# Patient Record
Sex: MALE | Race: WHITE | Employment: OTHER | URBAN - METROPOLITAN AREA
[De-identification: names, ages, dates, MRNs, and addresses within clinical notes are randomized per-mention and may not be internally consistent; named-entity substitution may affect disease eponyms.]

---

## 2019-10-24 ENCOUNTER — HOSPITAL ENCOUNTER (OUTPATIENT)
Dept: WOUND CARE | Age: 74
Discharge: HOME | End: 2019-10-24
Payer: MEDICARE

## 2019-10-24 VITALS — TEMPERATURE: 98 F | HEART RATE: 79 BPM | SYSTOLIC BLOOD PRESSURE: 133 MMHG | DIASTOLIC BLOOD PRESSURE: 76 MMHG

## 2019-10-24 VITALS — HEART RATE: 81 BPM | TEMPERATURE: 98.1 F | SYSTOLIC BLOOD PRESSURE: 138 MMHG | DIASTOLIC BLOOD PRESSURE: 78 MMHG

## 2019-10-24 PROBLEM — K94.00 COMPLICATION OF COLOSTOMY (HCC): Status: ACTIVE | Noted: 2019-10-24

## 2019-10-24 PROBLEM — R21 RASH AND OTHER NONSPECIFIC SKIN ERUPTION: Status: ACTIVE | Noted: 2019-10-24

## 2019-10-24 PROCEDURE — 99213 OFFICE O/P EST LOW 20 MIN: CPT

## 2019-10-24 PROCEDURE — 99211 OFF/OP EST MAY X REQ PHY/QHP: CPT

## 2019-10-24 RX ORDER — LANOLIN ALCOHOL/MO/W.PET/CERES
400 CREAM (GRAM) TOPICAL DAILY
COMMUNITY

## 2019-10-24 RX ORDER — RANITIDINE 150 MG/1
150 TABLET, FILM COATED ORAL 2 TIMES DAILY
COMMUNITY

## 2019-10-24 RX ORDER — TORSEMIDE 20 MG/1
20 TABLET ORAL DAILY
COMMUNITY

## 2019-10-24 RX ORDER — GUAIFENESIN 100 MG/5ML
81 LIQUID (ML) ORAL DAILY
COMMUNITY

## 2019-10-24 RX ORDER — BUDESONIDE AND FORMOTEROL FUMARATE DIHYDRATE 160; 4.5 UG/1; UG/1
2 AEROSOL RESPIRATORY (INHALATION) 2 TIMES DAILY
COMMUNITY

## 2019-10-24 RX ORDER — POTASSIUM CHLORIDE 750 MG/1
TABLET, FILM COATED, EXTENDED RELEASE ORAL DAILY
COMMUNITY

## 2019-10-24 RX ORDER — PRASUGREL 10 MG/1
10 TABLET, FILM COATED ORAL
COMMUNITY

## 2019-10-24 RX ORDER — DILTIAZEM HYDROCHLORIDE EXTENDED-RELEASE TABLETS 240 MG/1
240 TABLET, EXTENDED RELEASE ORAL DAILY
COMMUNITY

## 2019-10-24 RX ORDER — IRBESARTAN 150 MG/1
150 TABLET ORAL
COMMUNITY

## 2019-10-24 NOTE — DISCHARGE INSTRUCTIONS
Discharge Instructions for  [address] [facility], [address]  Telephone: [phone]    NAME:  Deedee Carver OF BIRTH:  1945  MEDICAL RECORD NUMBER:  [id number]  DATE:  10/24/2019  Please proceed to [facility] wound center today for ostomy consult. Dietary:  [x] Diet as tolerated: [] Calorie Diabetic Diet: [] No Added Salt:  [x] Increase Protein: [] Other:   Activity:  [x] Activity as tolerated:  [] Patient has no activity restrictions     [] Strict Bedrest: [] Remain off Work:     [] May return to full duty work:                                   [] Return to work with restrictions:             Return Appointment:  [] Wound and dressing supply provider:   [] ECF or Home Healthcare:  [] Wound Assessment: [] Physician or NP scheduled for Wound Assessment:   [x] [facility]  [x] Ordered tests: RX medrol dose pack     Electronically signed on 10/24/2019 at 2:37 PM     [address] [place] [address] Information: Should you experience any significant changes in your wound(s) or have questions about your wound care, please contact the [address] [place] [address] at [address] [place] [address] 8:00 am - 4:30. If you need help with your wound outside these hours and cannot wait until we are again available, contact your PCP or go to the hospital emergency room. PLEASE NOTE: IF YOU ARE UNABLE TO OBTAIN WOUND SUPPLIES, CONTINUE TO USE THE SUPPLIES YOU HAVE AVAILABLE UNTIL YOU ARE ABLE TO REACH US. IT IS MOST IMPORTANT TO KEEP THE WOUND COVERED AT ALL TIMES.       Physician Signature:_______________________    Date: ___________ Time:  ____________

## 2019-10-24 NOTE — WOUND CARE
Clinic Level of Care Assessment NAME:  Leoncio Gonzalez YOB: 1945 GENDER: male MEDICAL RECORD NUMBER:  [id number] DATE:  10/24/2019 Wound Count Document in [facility] Number of Wounds Assessed Points No Wounds/Ulcers []   0 Less than Three Wounds/Ulcers [x]   1  
3-6 Wounds/Ulcers []   2 Greater than 6 Wounds/Ulcers []   3 Ambulation Status Document in Coord/JIMMY/Mobility tab Status Definition Points Independent Independently able to ambulate. Fully able (without any assistance) to get on/off exam table/chair. []   0 Minimal Physical Assistance Requires physical assistance of one person to ambulate and/or position patient to be examined. Includes necessary physical assistance to position lower extremities on/off stool. [x]   1 Moderate Physical Assistance Requires at least one staff member to physically assist patient in ambulating into treatment room, and on/off exam table. []   2 Full Assistance Requires assistance of at least two staff members to transfer patient into treatment room and/or on/off exam table/chair. \"Total Transfer\". []   3 Dressing Complexity Document in [facility] and Write Appropriate Order Complexity Definition Points No Dressing  []   0 Simple Minimal, simple dressing. i.e. Band-aid, gauze, simple wrap. []   1 Intermediate Moderately complicated requiring licensed personnel to apply i.e. collagen matrix, ointments, gels, alginates. [x]   2 Complex Complicated requiring licensed personnel to apply dressings 6 or more wounds. []   3 Teaching Effort Document in Education Tab Effort Definition Points No Teaching  []   0 Simple Reinforce two or less topics. Document in Education navigator.  []   1 Intermediate Reinforce three to five topics and/or one additional  
new topic. Document in Education navigator. [x]   2 Complex Teach more than one new topic. New patient information packet reviewed and/or reinforce more than three topics. Document in Education navigator. HBO initial instruction. []   3 Patient Assessment and Planning Planning Definition Points Simple Multiple System Simple: Simple follow-up with routine assessment and planning. If Discharged, instructions and long term/follow-up care given to patient/caregiver. Discharged, instructions and/or After Visit Summary given to patient/caregiver and instructions completed. []   1 Intermediate Multiple System Intermediate: Contact with outside resources; i.e. Telephone calls to home health, [facility]. May include filling out forms and writing letters, arranging transportation, communication with insurance , vendors, etc.  Discharged, instructions and/or After Visit Summary given to patient/caregiver and instructions completed. [x]   2 Complex Multiple System Complex: Full, comprehensive assessment and planning. Follow the entire navigator under Wound Visit charting filling out each tab which includes OP Adm Database Screening, Education and CarePlan  HBO risk assessment completed. Discharged, instructions and/or After Visit Summary given to patient/caregiver and instructions completed. []   3 Is this the Patient's First Visit to the [address] [place] [address] Road No 
 
 
Is this Patient Established @ [facility] 
Yes Clinical Level of Care Points  0-2  Level 1 [] Points  3-5  Level 2 [] Points  6-9  Level 3 [x] Points  10-12  Level 4 [] Points  13-15  Level 5 [] Electronically signed by Juan Carlos Jones RN on 10/24/2019 at 4:45 PM

## 2019-10-24 NOTE — WOUND CARE
10/24/19 1403 Wound Abdomen 10/24/19 Date First Assessed/Time First Assessed: 10/24/19 1402   Present on Hospital Admission: Yes  Location: Abdomen  Date of First Observation: 10/24/19 Dressing Type Open to air Wound Length (cm) 19 cm Wound Width (cm) 41 cm Wound Depth (cm) 0.1 cm Wound Volume (cm^3) 77.9 cm^3 Drainage Amount None Wound Odor None Visit Vitals /76 (BP 1 Location: Right arm, BP Patient Position: At rest) Pulse 79 Temp 98 °F (36.7 °C)

## 2019-10-24 NOTE — H&P
[facility]  HISTORY AND PHYSICAL    Name:  Blake Mccabe  MR#:  [id number]  :  1945  ACCOUNT #:  [de-identified]  ADMIT DATE:  10/24/2019      HISTORY OF PRESENT ILLNESS:  The patient is a 69-year-old male who presents with a blistering rash on his abdomen which has been present for about a month. His history is he was recently diagnosed with lung cancer, he had diverticulitis that required emergency surgery with a colostomy after being on chemotherapy. He tolerated the colostomy quite well while he was at [facility], but after being at home, he started developing blisters and a rash on his abdomen. He has no evidence of a rash anywhere else on his body. His medication for the lung cancer has been held until he is able to get this under control and he is due to have a PET scan on 2019. MEDICATIONS:  Symbicort, potassium chloride, Avapro, diltiazem, torsemide, which is a loop diuretic, folic acid, ranitidine, Effient, vitamin D3, hydromorphone. ALLERGIES:  HE HAS NO KNOWN DRUG ALLERGIES. HABITS:  He discontinued cigarettes in , after a 50-pack year history. He does drink alcohol socially. PREVIOUS OPERATIONS:  Lumbar laminectomy and a revision lumbar laminectomy along with the previously described colostomy. PHYSICAL EXAMINATION:  GENERAL:  The patient presents tired and appeared older than stated age. VITAL SIGNS:  His temperature is 98, pulse 79, respirations 18, blood pressure 133/76. NEUROLOGIC:  Cranial nerves II through XII grossly intact. HEAD AND NECK:  Oral cavity, oropharynx, neck is normal.  LUNGS:  Clear to auscultation and percussion. HEART:  Regular rate and rhythm without murmur. ABDOMEN:   Soft, nontender. He does have a midline colostomy. He has a blistering rash around the stoma and extending laterally onto the left flank. He has no open wounds.   He does have scabs or obviously some of the blisters have previously ruptured. EXTREMITIES:  His legs are normal.  He has decreased strength but equal tone and strength bilaterally. Upper extremities:  Equal tone and strength bilaterally. ASSESSMENT AND PLAN:  My impression is this is an allergic contact dermatitis and he is reacting to the stoma appliance, or from seepage of stool from the bag causing a dermatitis. He did well while he was in the hospital and his family believes he might have had a change in devices once he was discharged and when he started developing the rash. I am sending him over to the [place] [address] today to see Sharmila Correia RN, for her evaluation of the stoma. I would like to try a more hypoallergenic colostomy system or a better fitting one. Because of his rash, I believe that a steroid would help, but I am concerned that a cream would make his appliance not adhere to the skin. I am going to give him a prescription for Medrol Dosepak. He will see Leann Khan this afternoon, and I will see him in followup in 1 week at the [facility]. All questions were answered. His condition on discharge is stable.         Aubrey Wilson MD      [id number]  D:  10/24/2019 14:46  T:  10/24/2019 15:43  JOB #:  [id number]

## 2019-10-24 NOTE — WOUND CARE
10/24/19 1637 Wound Abdomen 10/24/19 Date First Assessed/Time First Assessed: 10/24/19 1402   Present on Hospital Admission: Yes  Location: Abdomen  Date of First Observation: 10/24/19 Drainage Amount Small 
(Weeping from skin desquamation) Drainage Color Serous Wound Odor None Chacha-wound Assessment Denuded Cleansing and Cleansing Agents (Tap water) Dressing Type Applied  
([place] [address], Mepilex lite nonadherent foam) Discharge Condition: Stable Pain: 3 Ambulatory Status: [facility] Discharge Destination: Home Transportation: Car Accompanied by: Self  and Family/Caregiver Discharge instructions reviewed with Patient and Family/Caregiver  and copy or written instructions have been provided. All questions/concerns have been addressed at this time.

## 2019-10-24 NOTE — PROGRESS NOTES
Clinic Level of Care Assessment    NAME:  Sanya Rosales OF BIRTH:  1945 GENDER: male  MEDICAL RECORD NUMBER:  [id number]   DATE:  10/24/2019      Wound Count Document in [address] [place] [address]  Number of Wounds Assessed Points   No Wounds/Ulcers [x]   0   Less than Three Wounds/Ulcers []   1   3-6 Wounds/Ulcers []   2   Greater than 6 Wounds/Ulcers []   3     Ambulation Status Document in Coord/JIMMY/Mobility tab  Status Definition Points   Independent Independently able to ambulate. Fully able (without any assistance) to get on/off exam table/chair. [x]   0   Minimal Physical Assistance Requires physical assistance of one person to ambulate and/or position patient to be examined. Includes necessary physical assistance to position lower extremities on/off stool. []   1   Moderate Physical Assistance Requires at least one staff member to physically assist patient in ambulating into treatment room, and on/off exam table. []   2   Full Assistance Requires assistance of at least two staff members to transfer patient into treatment room and/or on/off exam table/chair. \"Total Transfer\". []   3     Dressing Complexity Document in LDA and Write Appropriate Order  Complexity Definition Points   No Dressing  [x]   0   Simple Minimal, simple dressing. i.e. Band-aid, gauze, simple wrap. []   1   Intermediate Moderately complicated requiring licensed personnel to apply i.e. collagen matrix, ointments, gels, alginates. []   2   Complex Complicated requiring licensed personnel to apply dressings 6 or more wounds. []   3     Teaching Effort Document in Education Tab   Effort Definition Points   No Teaching  []   0   Simple Reinforce two or less topics. Document in Education navigator. [x]   1   Intermediate Reinforce three to five topics and/or one additional   new topic. Document in Education navigator. []   2   Complex Teach more than one new topic.  New patient information   packet reviewed and/or reinforce more than three topics. Document in Education navigator. HBO initial instruction. []   3       Patient Assessment and Planning  Planning Definition Points   Simple Multiple System Simple: Simple follow-up with routine assessment and planning. If Discharged, instructions and long term/follow-up care given to patient/caregiver. Discharged, instructions and/or After Visit Summary given to patient/caregiver and instructions completed. [x]   1   Intermediate Multiple System Intermediate: Contact with outside resources; i.e. Telephone calls to home health, [facility]. May include filling out forms and writing letters, arranging transportation, communication with insurance , vendors, etc.  Discharged, instructions and/or After Visit Summary given to patient/caregiver and instructions completed. []   2   Complex Multiple System Complex: Full, comprehensive assessment and planning. Follow the entire navigator under Wound Visit charting filling out each tab which includes OP Adm Database Screening, Education and CarePlan  HBO risk assessment completed. Discharged, instructions and/or After Visit Summary given to patient/caregiver and instructions completed.    []   3           Is this the Patient's First Visit to the [address] [place] [address] Road  Yes      Is this Patient Established @ [facility]  Yes             Clinical Level of Care      Points  0-2  Level 1 [x]     Points  3-5  Level 2 []     Points  6-9  Level 3 []     Points  10-12  Level 4 []     Points  13-15  Level 5 []       Electronically signed by Jignesh Schmidt RN on 10/24/2019 at 6:22 PM

## 2019-11-01 ENCOUNTER — HOSPITAL ENCOUNTER (OUTPATIENT)
Dept: WOUND CARE | Age: 74
Discharge: HOME | End: 2019-11-01
Payer: MEDICARE

## 2019-11-01 VITALS
HEART RATE: 77 BPM | TEMPERATURE: 97.3 F | DIASTOLIC BLOOD PRESSURE: 69 MMHG | SYSTOLIC BLOOD PRESSURE: 148 MMHG | RESPIRATION RATE: 16 BRPM

## 2019-11-01 PROCEDURE — 99213 OFFICE O/P EST LOW 20 MIN: CPT

## 2019-11-01 NOTE — WOUND CARE
Clinic Level of Care Assessment    NAME:  Amanda Head OF BIRTH:  1945 GENDER: male  MEDICAL RECORD NUMBER:  [id number]   DATE:  11/1/2019      Wound Count Document in [address] [place] [address]  Number of Wounds Assessed Points   No Wounds/Ulcers []   0   Less than Three Wounds/Ulcers [x]   1   3-6 Wounds/Ulcers []   2   Greater than 6 Wounds/Ulcers []   3     Ambulation Status Document in Coord/JIMMY/Mobility tab  Status Definition Points   Independent Independently able to ambulate. Fully able (without any assistance) to get on/off exam table/chair. []   0   Minimal Physical Assistance Requires physical assistance of one person to ambulate and/or position patient to be examined. Includes necessary physical assistance to position lower extremities on/off stool. [x]   1   Moderate Physical Assistance Requires at least one staff member to physically assist patient in ambulating into treatment room, and on/off exam table. []   2   Full Assistance Requires assistance of at least two staff members to transfer patient into treatment room and/or on/off exam table/chair. \"Total Transfer\". []   3     Dressing Complexity Document in LDA and Write Appropriate Order  Complexity Definition Points   No Dressing  [x]   0   Simple Minimal, simple dressing. i.e. Band-aid, gauze, simple wrap. []   1   Intermediate Moderately complicated requiring licensed personnel to apply i.e. collagen matrix, ointments, gels, alginates. []   2   Complex Complicated requiring licensed personnel to apply dressings 6 or more wounds. []   3     Teaching Effort Document in Education Tab   Effort Definition Points   No Teaching  []   0   Simple Reinforce two or less topics. Document in Education navigator.  []   1   Intermediate Reinforce three to five topics and/or one additional   new topic. Document in Education navigator. [x]   2   Complex Teach more than one new topic.  New patient information   packet reviewed and/or reinforce more than three topics. Document in Education navigator. HBO initial instruction. []   3       Patient Assessment and Planning  Planning Definition Points   Simple Multiple System Simple: Simple follow-up with routine assessment and planning. If Discharged, instructions and long term/follow-up care given to patient/caregiver. Discharged, instructions and/or After Visit Summary given to patient/caregiver and instructions completed. []   1   Intermediate Multiple System Intermediate: Contact with outside resources; i.e. Telephone calls to home health, [facility]. May include filling out forms and writing letters, arranging transportation, communication with insurance , vendors, etc.  Discharged, instructions and/or After Visit Summary given to patient/caregiver and instructions completed. [x]   2   Complex Multiple System Complex: Full, comprehensive assessment and planning. Follow the entire navigator under Wound Visit charting filling out each tab which includes OP Adm Database Screening, Education and CarePlan  HBO risk assessment completed. Discharged, instructions and/or After Visit Summary given to patient/caregiver and instructions completed.    []   3           Is this the Patient's First Visit to the [address]  No      Is this Patient Established @ [facility]  Yes             Clinical Level of Care      Points  0-2  Level 1 []     Points  3-5  Level 2 []     Points  6-9  Level 3 [x]     Points  10-12  Level 4 []     Points  13-15  Level 5 []       Electronically signed by Margarita Davis RN on 11/1/2019 at 2:27 PM

## 2019-11-01 NOTE — PROGRESS NOTES
[address]  WOUND CARE PROGRESS NOTE    Name:  Neida Bhatti  MR#:  [id number]  :  1945  ACCOUNT #:  [de-identified]  DATE OF SERVICE:  2019    SUBJECTIVE:  The patient was seen today for a nursing visit for management of his colostomy. I saw him as a patient 8 days ago. He had a blistering rash on both sides of his abdomen and I placed him on a Medrol Dosepak. He relates that he did very well while on that medication but once being off it, his symptoms recurred and he had new blisters and he wanted me to refill the medication; therefore the nurses asked for me to see him, examine him, and answer his questions. OBJECTIVE:  VITAL SIGNS:  His temperature is 97.3, pulse 77, respirations 16, blood pressure 148/69. The patient's stoma is fine. His colostomy bag has stayed in place and is adhering to the abdominal wall skin better. He does have new multiple vesicles and bullae which might very well be as a consequence of his chemotherapy. They cross the midline and are in multiple dermatomes, speaking against the possibility of shingles. ASSESSMENT AND PLAN:  I explained to the patient that in spite of his request, I am very reluctant to give him another course of oral steroids right now, I would rather treat this topically and since he does have triamcinolone ointment at home prescribed by Dr. Jacqueline Lew for other issues, I would like him to use that medication and to use the smallest effective dose to keep him comfortable. The patient is due to see Dr. Morteza Beckett on 2019. I asked him to call her office to see if he can be seen sooner since obviously the patient is still having significant symptoms referable to his rash and this is out of our area of expertise and certainly within hers. So for now, we are going to try to treat this topically rather than systemically.      Lastly, the patient wanted a note allowing him to drive without using a seatbelt so as not to put any pressure on his colostomy bag or rash. From a safety standpoint, I would prefer that he use a pillow between his body and the seatbelt and continue to use a seatbelt while in the car. Therefore, no note was given. We will see the patient again in followup based upon his symptoms.       Paticia Nissen, MD [id number]  D:  11/01/2019 12:00  T:  11/01/2019 13:45  JOB #:  [id number]

## 2019-11-01 NOTE — DISCHARGE INSTRUCTIONS
Discharge Instructions for  [facility]  [address] [facility], [address] [facility]  Telephone: [phone]    NAME:  Eduardo  OF BIRTH:  1945  MEDICAL RECORD NUMBER:  [id number]  DATE:  11/1/2019    Wound Cleansing:   Do not scrub or use excessive force. Cleanse wound prior to applying a clean dressing with:  [x] Normal Saline [] Keep Wound Dry in Shower    [] Wound Cleanser   [] Cleanse wound with Mild Soap & Water  [] May Shower at Discharge   [x] Other: or tap water     Topical Treatments:  Do not apply lotions, creams, or ointments to wound bed unless directed. [] Apply moisturizing lotion to skin surrounding the wound prior to dressing change.  [] Apply antifungal ointment to skin surrounding the wound prior to dressing change.  [] Apply thin film of moisture barrier ointment to skin immediately around wound. [x] Other:  Triamcinolone ointment to open blisters     Dressings:           Wound Location Abdomen  [x] Apply Primary Dressing:       [] MediHoney Gel [] Alginate with Silver [] Alginate   [] Collagen [] Collagen with Silver   [] Santyl with Moisten saline gauze     [] Hydrocolloid   [] MediHoney Alginate [] Foam with Silver   [] Foam   [] Hydrofera Blue    [] Mepilex Border    [] Moisten with Saline [] Hydrogel [] Mepitel     [] Bactroban/Mupirocin [] Polysporin  [x] Other:  Mepilex lite nonadherent foam over blistered areas of abdomen  [] Pack wound loosely with  [] Iodoform   [] Plain Packing  [] Other   [] Cover and Secure with:     [] Gauze [] Orion [] Kerlix   [] Ace Wrap [] Cover Roll Tape [] ABD     [] Other:    Avoid contact of tape with skin.   [x] Change dressing: [] Daily    [x] Every Other Day [] Three times per week   [] Once a week [] Do Not Change Dressing   [] Other:     Dietary:  [x] Diet as tolerated: [] Calorie Diabetic Diet: [] No Added Salt:  [x] Increase Protein: [] Other:   Activity:  [x] Activity as tolerated:  [] Patient has no activity restrictions     [] Strict Bedrest: [] Remain off Work:     [] May return to full duty work:                                   [] Return to work with restrictions:             Return Appointment:  [] Wound and dressing supply provider:   [] ECF or Home Healthcare:  [] Wound Assessment: [] Physician or NP scheduled for Wound Assessment:   [x] Return Appointment: Ostomy Nurse visit 2 weeks  [] Ordered tests:      Electronically signed Nahum Pierson RN on 11/1/2019 at 2:14 PM     [address] [place] [address] Information: Should you experience any significant changes in your wound(s) or have questions about your wound care, please contact the [facility] at [address] [place] [place] [address] 8:00 am - 4:30. If you need help with your wound outside these hours and cannot wait until we are again available, contact your PCP or go to the hospital emergency room. PLEASE NOTE: IF YOU ARE UNABLE TO OBTAIN WOUND SUPPLIES, CONTINUE TO USE THE SUPPLIES YOU HAVE AVAILABLE UNTIL YOU ARE ABLE TO REACH US. IT IS MOST IMPORTANT TO KEEP THE WOUND COVERED AT ALL TIMES.      Physician Signature:_______________________    Date: ___________ Time:  ____________

## 2019-11-01 NOTE — WOUND CARE
Patient & wife report that patient has been achieving at least 2 days wear time with the new Debbie one piece soft convex cut to fit appliance #61563 (this represents a vast improvement over prior experience which was 2 to 3 pouches per day). Patient continues to have blisters pop up on the abdomen and he has been told by oncology that these are related to the chemotherapy which was discontinued in September (he was told these may continue to occur for up to one year). He completed the course of PO steriods provided by Dr. Russella Fothergill last week and he reports that the blisters hurt less while he was on the steriods. Dr. Russella Fothergill states he does not wish to prescribe another course of PO steriods but he has recommended that patient apply Triamcinolone ointment which patient already has at home. Wife verbalized understanding of plan and will begin applying the Triamcinolone this evening, she will then cover the areas with the Mepilex lite nonadherent foam. The appliance patient is currently wearing was placed by wife earlier this morning and appears to have a secure seal. Patient wishes that we not remove it at this time. Patient and wife were instructed in the crusting procedure and use of the stoma paste to act as caulk to prevent leaking. He will follow-up with this Ostomy Nurse in 2 weeks or sooner if he begins to have issues with pouch leaking again. Orders were faxed to [facility]. Discharge Condition: Stable     Pain: 0    Ambulatory Status: Cane    Discharge Destination: Home     Transportation: Car    Accompanied by: Self  and Family/Caregiver     Discharge instructions reviewed with Patient and Family/Caregiver  and copy or written instructions have been provided. All questions/concerns have been addressed at this time.